# Patient Record
Sex: MALE | Race: WHITE | NOT HISPANIC OR LATINO | Employment: FULL TIME | URBAN - METROPOLITAN AREA
[De-identification: names, ages, dates, MRNs, and addresses within clinical notes are randomized per-mention and may not be internally consistent; named-entity substitution may affect disease eponyms.]

---

## 2023-04-08 ENCOUNTER — HOSPITAL ENCOUNTER (EMERGENCY)
Facility: CLINIC | Age: 53
Discharge: HOME | End: 2023-04-08
Attending: EMERGENCY MEDICINE | Admitting: EMERGENCY MEDICINE
Payer: COMMERCIAL

## 2023-04-08 ENCOUNTER — HOSPITAL ENCOUNTER (EMERGENCY)
Facility: CLINIC | Age: 53
End: 2023-04-08

## 2023-04-08 VITALS
OXYGEN SATURATION: 98 % | DIASTOLIC BLOOD PRESSURE: 74 MMHG | RESPIRATION RATE: 18 BRPM | HEART RATE: 52 BPM | TEMPERATURE: 98.1 F | SYSTOLIC BLOOD PRESSURE: 140 MMHG

## 2023-04-08 DIAGNOSIS — S05.02XA ABRASION OF LEFT CORNEA, INITIAL ENCOUNTER: ICD-10-CM

## 2023-04-08 PROCEDURE — 99284 EMERGENCY DEPT VISIT MOD MDM: CPT

## 2023-04-08 RX ORDER — TETRACAINE HYDROCHLORIDE 5 MG/ML
2 SOLUTION OPHTHALMIC ONCE
Status: DISCONTINUED | OUTPATIENT
Start: 2023-04-08 | End: 2023-04-08 | Stop reason: HOSPADM

## 2023-04-08 RX ORDER — TETRACAINE HYDROCHLORIDE 5 MG/ML
SOLUTION OPHTHALMIC
Status: DISCONTINUED
Start: 2023-04-08 | End: 2023-04-08 | Stop reason: HOSPADM

## 2023-04-08 RX ORDER — POLYVINYL ALCOHOL 14 MG/ML
1 SOLUTION/ DROPS OPHTHALMIC PRN
Qty: 15 ML | Refills: 0 | Status: SHIPPED | OUTPATIENT
Start: 2023-04-08

## 2023-04-08 RX ORDER — OFLOXACIN 3 MG/ML
SOLUTION/ DROPS OPHTHALMIC
Qty: 10 ML | Refills: 0 | Status: SHIPPED | OUTPATIENT
Start: 2023-04-08

## 2023-04-08 ASSESSMENT — ENCOUNTER SYMPTOMS
CHILLS: 0
EYE REDNESS: 1
HEADACHES: 0
FEVER: 0
EYE PAIN: 1
PHOTOPHOBIA: 1
EYE ITCHING: 1

## 2023-04-08 ASSESSMENT — VISUAL ACUITY
OD: 20/40;WITHOUT CORRECTIVE LENSES
OS: 20/60;WITHOUT CORRECTIVE LENSES

## 2023-04-08 ASSESSMENT — ACTIVITIES OF DAILY LIVING (ADL): ADLS_ACUITY_SCORE: 33

## 2023-04-08 NOTE — DISCHARGE INSTRUCTIONS
-Take acetaminophen 500 to 1000 mg by mouth every 4 to 6 hours as needed for pain or fever.  Do not take more than 4000 mg in 24 hours.  Do not take within 6 hours of another acetaminophen containing medication such as norco (vicodin) or percocet.  - Take ibuprofen 600 to 800 mg by mouth every 6 to 8 hours as needed for pain or fever    Discharge Instructions  Corneal Abrasion    Today you were treated for a scratch on the cornea of your eye, or a corneal abrasion.  The cornea is the clear layer of tissue that covers the colored part of your eye. Corneal abrasions are caused when something scratches your eye such as fingernails, animal paws, branches, pieces of paper, tiny pieces of rust, wood, glass, plastic or contact lenses. Corneal abrasions often make people feel like there is a speck of sand in the eye.  These abrasions also can cause severe eye pain, watery eyes, blurred vision and pain with bright light.      Generally, every Emergency Department visit should have a follow-up clinic visit with either a primary or a specialty clinic/provider. Please follow-up as instructed by your emergency provider today.    Return to the Emergency Department if:  Your vision worsens.  The appearance of your eye concerns you.  Anything else concerns you.    Treatment:  Tylenol  (acetaminophen), Motrin  (ibuprofen), or Advil  (ibuprofen) will help with the pain from the abrasion.    Use the antibiotic eye ointment or drops as directed until the antibiotics are finished.  Do not wear contacts until the antibiotic is finished.  Do not patch your eye, because this can increase your risk for infection.  Your symptoms should improve gradually over the next 2 days.  If they are not improving, it is very important that you see an eye provider right away.  If over the next few days, the pain is getting worse, you have increasing difficulty with vision or you have yellow drainage from your eye, you need to see the eye provider that  day.  If you have difficulty getting in to see an eye provider, please return to an Urgent Care or Emergency Department for further evaluation and treatment.    If you were given a prescription for medicine here today, be sure to read all of the information (including the package insert) that comes with your prescription.  This will include important information about the medicine, its side effects, and any warnings that you need to know about.  The pharmacist who fills the prescription can provide more information and answer questions you may have about the medicine.  If you have questions or concerns that the pharmacist cannot address, please call or return to the Emergency Department.   Remember that you can always come back to the Emergency Department if you are not able to see your regular provider in the amount of time listed above, if you get any new symptoms, or if there is anything that worries you.

## 2023-04-08 NOTE — ED PROVIDER NOTES
History     Chief Complaint:  Left Eye Problem       HPI   Jese Mendoza is a 52 year old male with a history of Lasik and GERD who presents with left eye problem. Patient reports working on a car in his garage yesterday evening where there were glass pieces and metal shavings. He states wearing safety glasses throughout the process. After coming in from the garage, he reported having an itch in his eye and itching it. Immediately after, he stated his eye to become severely irritated, stating it to feel more itchy and pressure. He states removing his contact and using eye drops which did not resolve his discomfort. Patient reports waking up this morning and felt continue irritation, more blurred vision, photophobia, and nausea. Denies headache, fevers, chills, or tobacco use.     Independent Historian:   Patient    Review of External Notes: See MDM.     ROS:  Review of Systems   Constitutional: Negative for chills and fever.   Eyes: Positive for photophobia, pain, redness, itching and visual disturbance.   Neurological: Negative for headaches.   All other systems reviewed and are negative.    Allergies:  No Known Allergies     Medications:    Flexeril    Past Medical History:    Left anterior cruciate ligament tear  KATIE  Obesity  Anemia  Talipes cavus  Plantar fascial fibromatosis  Serrated adenoma of colon  Colonic polyps  Hearing loss  GERD    Past Surgical History:    Tympanostomy   Lasik  Hernia repair  Vasectomy   Shoulder arthroscopy, left  Synovectomy, left shoulder     Family History:    Father: obesity, thromboembolic disease  Mother: ovarian cancer  Daughter(s): asthma    Social History:  Patient arrived via private vehicle to the ED.     Physical Exam     Patient Vitals for the past 24 hrs:   BP Temp Temp src Pulse Resp SpO2   04/08/23 0942 (!) 140/74 -- -- 52 -- 98 %   04/08/23 0758 129/79 98.1  F (36.7  C) Oral 57 18 99 %        Physical Exam  Constitutional: Well developed, nontox appearance  Head:  Atraumatic.   Neck:  no stridor  Eyes: no scleral icterus, EOMI, PERRL lid everted, no evidence of foreign body, fluorescein exam demonstrated a small corneal abrasion to approximately 9:00 on the patient's cornea, no foreign body noted, no fluorescein uptake, absent Jessica sign  Cardiovascular: Borderline regular bradycardia  Pulmonary/Chest: nml resp effort  Ext: Warm, well perfused, no edema  Neurological: A&O, symmetric facies, moves ext x4  Skin: Skin is warm and dry.   Psychiatric: Behavior is normal. Thought content normal.   Nursing note and vitals reviewed.    Emergency Department Course     Emergency Department Course & Assessments:    Interventions:  Medications - No data to display     Independent Interpretation (X-rays, CTs, rhythm strip):  None    Assessments/Consultations/Discussion of Management or Tests:   ED Course as of 23 1238   Sat 2023   0912 I obtained history and examined the patient as noted above.         Social Determinants of Health affecting care:   See MDM.     Disposition:  The patient was discharged to home.     Impression & Plan    Medical Decision Makin year old male presenting w/ left eye pain     Social determinants affecting patient's health include:  No significant social determinants negatively affecting the patient's health other than lack of PCP potentially increasing risk for recurrent ED visits     I reviewed medical records from  sports medicine office visit on 2/15/2023, family medicine office visit on 2023 for an overview of the patient's previous medical problems     Presentation consistent with L corneal abrasion, no foreign body noted on exam.  Eye irrigated as well.  Lids everted, swept and showed no evidence of foreign body. Doubt bacterial conjunctivitis, viral conjunctivitis, foreign body, chemical vs allergic conjunctivitis, corneal ulcer, HSV, herpes zoster opthalmicus, endopthalmitis, orbital cellulitis.  History and exam are consistent  with a corneal abrasion.  The patient was treated with antibiotics appropriate for his history of contact use, prescribed as noted below. At this time I feel the patient is safe for discharge.  Recommendations given regarding follow up with ophthalmology and return to the emergency department as needed for new or worsening symptoms.  Patient counseled on all results, diagnosis and disposition.  They are understanding and agreeable to plan. Patient discharged in stable condition.       Diagnosis:    ICD-10-CM    1. Abrasion of left cornea, initial encounter  S05.02XA            Discharge Medications:  Discharge Medication List as of 4/8/2023  9:40 AM      START taking these medications    Details   ofloxacin (OCUFLOX) 0.3 % ophthalmic solution Instill 1 to 2 drops in affected eye every 2-4 hours for the first 2 days and then 4 times daily for an additional 5 days; Course: 7 days total, Disp-10 mL, R-0, E-Prescribe      polyvinyl alcohol (LIQUIFILM TEARS) 1.4 % ophthalmic solution Apply 1 drop to eye as needed for dry eyes (or eye irritation), Disp-15 mL, R-0, E-Prescribe              Scribe Disclosure:  FRANCISCO FLORES, am serving as a scribe at 9:01 AM on 4/8/2023 to document services personally performed by Stefan Amin MD based on my observations and the provider's statements to me.     4/8/2023   Stefan Amin MD Vaughn, Christopher E, MD  04/08/23 0820

## 2023-04-08 NOTE — ED TRIAGE NOTES
Patient reports that last night he came in from the garage and itched his eye which than began to hurt. Patient states he removed his contact but the itching and pain continued. Patient states he has tried drops at home but nothing has helped.      Triage Assessment     Row Name 04/08/23 0757       Triage Assessment (Adult)    Airway WDL WDL       Respiratory WDL    Respiratory WDL WDL       Skin Circulation/Temperature WDL    Skin Circulation/Temperature WDL WDL       Cardiac WDL    Cardiac WDL WDL       Peripheral/Neurovascular WDL    Peripheral Neurovascular WDL WDL       Cognitive/Neuro/Behavioral WDL    Cognitive/Neuro/Behavioral WDL WDL